# Patient Record
Sex: MALE
[De-identification: names, ages, dates, MRNs, and addresses within clinical notes are randomized per-mention and may not be internally consistent; named-entity substitution may affect disease eponyms.]

---

## 2020-08-11 PROBLEM — Z00.00 ENCOUNTER FOR PREVENTIVE HEALTH EXAMINATION: Status: ACTIVE | Noted: 2020-08-11

## 2020-08-20 ENCOUNTER — NON-APPOINTMENT (OUTPATIENT)
Age: 80
End: 2020-08-20

## 2020-08-20 ENCOUNTER — APPOINTMENT (OUTPATIENT)
Dept: HEART AND VASCULAR | Facility: CLINIC | Age: 80
End: 2020-08-20
Payer: MEDICARE

## 2020-08-20 VITALS
DIASTOLIC BLOOD PRESSURE: 70 MMHG | WEIGHT: 156 LBS | HEIGHT: 64 IN | RESPIRATION RATE: 14 BRPM | HEART RATE: 65 BPM | SYSTOLIC BLOOD PRESSURE: 110 MMHG | BODY MASS INDEX: 26.63 KG/M2

## 2020-08-20 DIAGNOSIS — Z86.69 PERSONAL HISTORY OF OTHER DISEASES OF THE NERVOUS SYSTEM AND SENSE ORGANS: ICD-10-CM

## 2020-08-20 DIAGNOSIS — R09.89 OTHER SPECIFIED SYMPTOMS AND SIGNS INVOLVING THE CIRCULATORY AND RESPIRATORY SYSTEMS: ICD-10-CM

## 2020-08-20 DIAGNOSIS — Z82.49 FAMILY HISTORY OF ISCHEMIC HEART DISEASE AND OTHER DISEASES OF THE CIRCULATORY SYSTEM: ICD-10-CM

## 2020-08-20 DIAGNOSIS — I34.0 NONRHEUMATIC MITRAL (VALVE) INSUFFICIENCY: ICD-10-CM

## 2020-08-20 DIAGNOSIS — I35.1 NONRHEUMATIC AORTIC (VALVE) INSUFFICIENCY: ICD-10-CM

## 2020-08-20 DIAGNOSIS — R01.1 CARDIAC MURMUR, UNSPECIFIED: ICD-10-CM

## 2020-08-20 PROCEDURE — 93880 EXTRACRANIAL BILAT STUDY: CPT

## 2020-08-20 PROCEDURE — 99204 OFFICE O/P NEW MOD 45 MIN: CPT

## 2020-08-20 PROCEDURE — 93000 ELECTROCARDIOGRAM COMPLETE: CPT

## 2020-08-20 RX ORDER — ROSUVASTATIN CALCIUM 20 MG/1
20 TABLET, FILM COATED ORAL DAILY
Refills: 0 | Status: ACTIVE | COMMUNITY

## 2020-08-20 RX ORDER — PAROXETINE HYDROCHLORIDE 20 MG/1
20 TABLET, FILM COATED ORAL DAILY
Refills: 0 | Status: ACTIVE | COMMUNITY

## 2020-08-20 NOTE — HISTORY OF PRESENT ILLNESS
[FreeTextEntry1] : 80 year old male with a history of hypertension.  BP has been labile for the past 7 years.  He denies dizziness, headaches or blurred vision.  He has no chest pain or palpitation.  Has occasional ARMAS.  No PND or orthopnea.

## 2020-08-20 NOTE — DISCUSSION/SUMMARY
[FreeTextEntry1] : Will adjust dosing schedule.  Change Propranolol to evening.  Continue Ramipril & Doxazosin at bedtime.  If BP remains elevated, will adjust doses of meds.  Maintain low sodium diet.  Ambulate as tolerated.  \par The above was discussed with the patient with a Citizen of [place]  present throughout the consultation.\par

## 2020-08-20 NOTE — ASSESSMENT
[FreeTextEntry1] : EKG: Sinus rhythm, nonspecific ST & T wave changes\par Echo:  Normal LV function, mild MR & moderate AI\par Carotid duplex scan:  Bilateral <50% ICA stenoses\par BP elevated at bedtime & early AM.\par No evidence of CHF on exam.\par

## 2020-08-20 NOTE — PHYSICAL EXAM
[General Appearance - Well Developed] : well developed [Well Groomed] : well groomed [Normal Appearance] : normal appearance [General Appearance - In No Acute Distress] : no acute distress [General Appearance - Well Nourished] : well nourished [No Deformities] : no deformities [Eyelids - No Xanthelasma] : the eyelids demonstrated no xanthelasmas [Normal Conjunctiva] : the conjunctiva exhibited no abnormalities [Normal Oral Mucosa] : normal oral mucosa [No Oral Pallor] : no oral pallor [Normal Jugular Venous V Waves Present] : normal jugular venous V waves present [No Oral Cyanosis] : no oral cyanosis [Exaggerated Use Of Accessory Muscles For Inspiration] : no accessory muscle use [Respiration, Rhythm And Depth] : normal respiratory rhythm and effort [Auscultation Breath Sounds / Voice Sounds] : lungs were clear to auscultation bilaterally [Abdomen Tenderness] : non-tender [Abdomen Soft] : soft [Gait - Sufficient For Exercise Testing] : the gait was sufficient for exercise testing [Abnormal Walk] : normal gait [Abdomen Mass (___ Cm)] : no abdominal mass palpated [Nail Clubbing] : no clubbing of the fingernails [] : no ischemic changes [Petechial Hemorrhages (___cm)] : no petechial hemorrhages [Cyanosis, Localized] : no localized cyanosis [FreeTextEntry1] : apical systolic murmur, diastolic murmur at the lower left sternal border

## 2020-09-17 ENCOUNTER — NON-APPOINTMENT (OUTPATIENT)
Age: 80
End: 2020-09-17

## 2020-09-17 ENCOUNTER — APPOINTMENT (OUTPATIENT)
Dept: HEART AND VASCULAR | Facility: CLINIC | Age: 80
End: 2020-09-17
Payer: MEDICARE

## 2020-09-17 VITALS
SYSTOLIC BLOOD PRESSURE: 120 MMHG | HEART RATE: 66 BPM | DIASTOLIC BLOOD PRESSURE: 80 MMHG | WEIGHT: 156 LBS | RESPIRATION RATE: 15 BRPM | HEIGHT: 64 IN | BODY MASS INDEX: 26.63 KG/M2

## 2020-09-17 DIAGNOSIS — R42 DIZZINESS AND GIDDINESS: ICD-10-CM

## 2020-09-17 PROCEDURE — 93000 ELECTROCARDIOGRAM COMPLETE: CPT

## 2020-09-17 PROCEDURE — 99215 OFFICE O/P EST HI 40 MIN: CPT

## 2020-09-17 RX ORDER — DOXAZOSIN 4 MG/1
4 TABLET ORAL
Qty: 45 | Refills: 5 | Status: ACTIVE | COMMUNITY

## 2020-09-17 NOTE — ASSESSMENT
[FreeTextEntry1] : EKG:  Sinus rhythm, No change from prior EKG.\par Hemodynamically stable.  No signs of CHF on exam.  BP uncontrolled at night & early AM.

## 2020-09-17 NOTE — HISTORY OF PRESENT ILLNESS
[FreeTextEntry1] : The patient states that his BP remains elevated at night & early AM.  Middle of the day, his BP is good.  Takes Metoprolol twice a day.  Had episode after PT where he got up from chair, felt dizzy, sweaty & nauseous & after 10 minutes resolved on it's own.

## 2020-09-17 NOTE — DISCUSSION/SUMMARY
[FreeTextEntry1] : increase dose of Doxazosin to 6 mg in the evening.  Maintain on all other baseline medication.  Low sodiuM diet reinforced.  Ambulate as tolerated.  Advised to get up slowly from chair as may get dizzy with changes in position due to BP meds.\par The above was discussed with the patient with a [place]  present throughout the consultation.\par 25 minutes spent with the patient & >50% of the time spent in the encounter involved counseling & coordination of care for any or all of the diagnoses submitted.\par

## 2020-10-14 ENCOUNTER — APPOINTMENT (OUTPATIENT)
Dept: HEART AND VASCULAR | Facility: CLINIC | Age: 80
End: 2020-10-14
Payer: MEDICARE

## 2020-10-14 ENCOUNTER — NON-APPOINTMENT (OUTPATIENT)
Age: 80
End: 2020-10-14

## 2020-10-14 VITALS
WEIGHT: 156 LBS | DIASTOLIC BLOOD PRESSURE: 80 MMHG | SYSTOLIC BLOOD PRESSURE: 120 MMHG | BODY MASS INDEX: 26.63 KG/M2 | RESPIRATION RATE: 15 BRPM | HEIGHT: 64 IN

## 2020-10-14 PROCEDURE — 99213 OFFICE O/P EST LOW 20 MIN: CPT

## 2020-10-14 NOTE — PHYSICAL EXAM
[General Appearance - Well Developed] : well developed [Normal Appearance] : normal appearance [Well Groomed] : well groomed [General Appearance - Well Nourished] : well nourished [General Appearance - In No Acute Distress] : no acute distress [No Deformities] : no deformities [Eyelids - No Xanthelasma] : the eyelids demonstrated no xanthelasmas [Normal Conjunctiva] : the conjunctiva exhibited no abnormalities [Normal Oral Mucosa] : normal oral mucosa [No Oral Pallor] : no oral pallor [Normal Jugular Venous V Waves Present] : normal jugular venous V waves present [No Oral Cyanosis] : no oral cyanosis [Respiration, Rhythm And Depth] : normal respiratory rhythm and effort [Exaggerated Use Of Accessory Muscles For Inspiration] : no accessory muscle use [Auscultation Breath Sounds / Voice Sounds] : lungs were clear to auscultation bilaterally [Abdomen Tenderness] : non-tender [Abdomen Soft] : soft [Abnormal Walk] : normal gait [Abdomen Mass (___ Cm)] : no abdominal mass palpated [Gait - Sufficient For Exercise Testing] : the gait was sufficient for exercise testing [Nail Clubbing] : no clubbing of the fingernails [] : no ischemic changes [Petechial Hemorrhages (___cm)] : no petechial hemorrhages [Cyanosis, Localized] : no localized cyanosis [FreeTextEntry1] : apical systolic murmur, diastolic murmur at the lower left sternal border

## 2020-10-14 NOTE — HISTORY OF PRESENT ILLNESS
[FreeTextEntry1] : Patient is compliant with medical regimen.  Was taking 6 mg of Doxazosin but no improvement in PM/night time  BP so he increased dose  to 8 mg with no improvement.  During the day, BP remains normal.  He denies any chest pain, palpitation, dizziness, headaches or blurred vision.

## 2020-10-14 NOTE — DISCUSSION/SUMMARY
[FreeTextEntry1] : Will maintain Doxazosin at 6 mg Q PM.  Increase Ramipril to 5 mg every evening.  If no improvement in BP after 1 week, increase to 10 mg every evening. maintain low sodium diet.\par The above was discussed with the patient with a [place]  present throughout the consultation.\par

## 2020-11-11 ENCOUNTER — NON-APPOINTMENT (OUTPATIENT)
Age: 80
End: 2020-11-11

## 2020-11-11 ENCOUNTER — APPOINTMENT (OUTPATIENT)
Dept: HEART AND VASCULAR | Facility: CLINIC | Age: 80
End: 2020-11-11
Payer: MEDICARE

## 2020-11-11 VITALS
DIASTOLIC BLOOD PRESSURE: 70 MMHG | BODY MASS INDEX: 26.46 KG/M2 | HEART RATE: 61 BPM | RESPIRATION RATE: 15 BRPM | HEIGHT: 64 IN | WEIGHT: 155 LBS | SYSTOLIC BLOOD PRESSURE: 132 MMHG

## 2020-11-11 DIAGNOSIS — I10 ESSENTIAL (PRIMARY) HYPERTENSION: ICD-10-CM

## 2020-11-11 PROCEDURE — 93000 ELECTROCARDIOGRAM COMPLETE: CPT

## 2020-11-11 PROCEDURE — 99214 OFFICE O/P EST MOD 30 MIN: CPT

## 2020-11-11 RX ORDER — RAMIPRIL 10 MG/1
10 CAPSULE ORAL AT BEDTIME
Qty: 90 | Refills: 1 | Status: ACTIVE | COMMUNITY

## 2020-11-11 RX ORDER — PROPRANOLOL HYDROCHLORIDE 40 MG/1
40 TABLET ORAL TWICE DAILY
Qty: 60 | Refills: 5 | Status: DISCONTINUED | COMMUNITY
End: 2020-11-11

## 2020-11-11 RX ORDER — ATENOLOL 50 MG/1
50 TABLET ORAL
Refills: 0 | Status: ACTIVE | COMMUNITY

## 2020-11-11 RX ORDER — PRIMIDONE 50 MG/1
50 TABLET ORAL
Refills: 0 | Status: ACTIVE | COMMUNITY

## 2020-11-11 NOTE — DISCUSSION/SUMMARY
[FreeTextEntry1] : Maintain on current medical regimen.  Ambulate as tolerated.  Maintain low sodium,  low cholesterol diet.\par